# Patient Record
Sex: FEMALE | URBAN - METROPOLITAN AREA
[De-identification: names, ages, dates, MRNs, and addresses within clinical notes are randomized per-mention and may not be internally consistent; named-entity substitution may affect disease eponyms.]

---

## 2023-02-20 ENCOUNTER — APPOINTMENT (OUTPATIENT)
Dept: URBAN - METROPOLITAN AREA CLINIC 208 | Age: 63
Setting detail: DERMATOLOGY
End: 2023-02-21

## 2023-02-20 DIAGNOSIS — L90.5 SCAR CONDITIONS AND FIBROSIS OF SKIN: ICD-10-CM

## 2023-02-20 PROCEDURE — 99202 OFFICE O/P NEW SF 15 MIN: CPT

## 2023-02-20 PROCEDURE — OTHER COUNSELING: OTHER

## 2023-02-20 PROCEDURE — OTHER ADDITIONAL NOTES: OTHER

## 2023-02-20 PROCEDURE — OTHER MIPS QUALITY: OTHER

## 2023-02-20 ASSESSMENT — LOCATION SIMPLE DESCRIPTION DERM: LOCATION SIMPLE: LEFT EAR

## 2023-02-20 ASSESSMENT — LOCATION DETAILED DESCRIPTION DERM: LOCATION DETAILED: LEFT ANTERIOR EARLOBE

## 2023-02-20 ASSESSMENT — LOCATION ZONE DERM: LOCATION ZONE: EAR

## 2023-02-20 NOTE — HPI: LACERATION, EAR
How Severe Is It?: moderate
How Is Your Laceration Healing?: healed
Is This A New Presentation, Or A Follow-Up?: Ear Laceration
Additional History: Here to discuss ear lobe repair. Has questions as to when she is able to wear earrings again after repair.

## 2023-02-20 NOTE — PROCEDURE: ADDITIONAL NOTES
Additional Notes: Recommended Willard Castrejon MD for earlobe repair\\nDermatologist in [place], [place]\\[email or web address]\\[address], [place] [address]\\[phone]\\n\\nQuoted $600 if interested in getting repair with Dr. Law. Additional Notes: Recommended Willard Castrejon MD for earlobe repair\\nDermatologist in [place], [place]\\[email or web address]\\[address], [place] [address]\\[phone]\\n\\nQuoted $600 if interested in getting repair with Dr. Law.